# Patient Record
Sex: MALE | Race: OTHER | HISPANIC OR LATINO | Employment: FULL TIME | URBAN - METROPOLITAN AREA
[De-identification: names, ages, dates, MRNs, and addresses within clinical notes are randomized per-mention and may not be internally consistent; named-entity substitution may affect disease eponyms.]

---

## 2023-06-12 ENCOUNTER — OFFICE VISIT (OUTPATIENT)
Dept: FAMILY MEDICINE CLINIC | Facility: CLINIC | Age: 23
End: 2023-06-12
Payer: COMMERCIAL

## 2023-06-12 VITALS
HEART RATE: 75 BPM | HEIGHT: 64 IN | TEMPERATURE: 98.9 F | BODY MASS INDEX: 27.31 KG/M2 | DIASTOLIC BLOOD PRESSURE: 78 MMHG | WEIGHT: 160 LBS | SYSTOLIC BLOOD PRESSURE: 118 MMHG | RESPIRATION RATE: 17 BRPM | OXYGEN SATURATION: 96 %

## 2023-06-12 DIAGNOSIS — Z76.89 ENCOUNTER TO ESTABLISH CARE: Primary | ICD-10-CM

## 2023-06-12 DIAGNOSIS — Z00.00 ANNUAL PHYSICAL EXAM: ICD-10-CM

## 2023-06-12 PROCEDURE — 99385 PREV VISIT NEW AGE 18-39: CPT | Performed by: PHYSICIAN ASSISTANT

## 2023-06-12 RX ORDER — KETOCONAZOLE 20 MG/ML
SHAMPOO TOPICAL
COMMUNITY
Start: 2023-05-04 | End: 2023-06-12 | Stop reason: ALTCHOICE

## 2023-06-12 RX ORDER — CLOBETASOL PROPIONATE 0.05 G/100ML
SHAMPOO TOPICAL
COMMUNITY
Start: 2023-04-28 | End: 2023-06-12 | Stop reason: ALTCHOICE

## 2023-06-12 RX ORDER — TERBINAFINE HYDROCHLORIDE 250 MG/1
TABLET ORAL
COMMUNITY
Start: 2023-05-04 | End: 2023-06-12 | Stop reason: ALTCHOICE

## 2023-06-12 RX ORDER — GRISEOFULVIN (MICROSIZE) 125 MG/5ML
SUSPENSION ORAL
COMMUNITY
Start: 2023-04-26 | End: 2023-06-12 | Stop reason: ALTCHOICE

## 2023-06-12 NOTE — PROGRESS NOTES
ADULT ANNUAL PHYSICAL   [facility] PRIMARY CARE [facility]    NAME: Lloyd Cototn  AGE: 25 y o  SEX: male  : 2000     DATE: 2023     Assessment and Plan:     Problem List Items Addressed This Visit        Other    BMI 27 0-27 9,adult     BMI Counseling: Body mass index is 27 46 kg/m²  The BMI is above normal  Nutrition recommendations include reducing portion sizes, decreasing overall calorie intake, 3-5 servings of fruits/vegetables daily, moderation in carbohydrate intake, increasing intake of lean protein, reducing intake of saturated fat and trans fat and reducing intake of cholesterol  Exercise recommendations include exercising 3-5 times per week and strength training exercises  Other Visit Diagnoses     Encounter to establish care    -  Primary    moved from [place] in 2022, works as  for [employer], establish care to get 's physical    Annual physical exam              Immunizations and preventive care screenings were discussed with patient today  Appropriate education was printed on patient's after visit summary  Counseling:  Alcohol/drug use: discussed moderation in alcohol intake, the recommendations for healthy alcohol use, and avoidance of illicit drug use  Dental Health: discussed importance of regular tooth brushing, flossing, and dental visits  Injury prevention: discussed safety/seat belts, safety helmets, smoke detectors, carbon dioxide detectors, and smoking near bedding or upholstery  Sexual health: discussed sexually transmitted diseases, partner selection, use of condoms, avoidance of unintended pregnancy, and contraceptive alternatives  Exercise: the importance of regular exercise/physical activity was discussed  Recommend exercise 3-5 times per week for at least 30 minutes  Depression Screening and Follow-up Plan: Patient was screened for depression during today's encounter   They screened negative with a PHQ-2 score of 0  Return in about 1 year (around 6/12/2024)  Chief Complaint:     Chief Complaint   Patient presents with   • Establish Care      History of Present Illness:     Adult Annual Physical   Patient here for a comprehensive physical exam  The patient reports no problems  [address] in March was last appointment  Came to [place] from [place] in September  Lives with wife  Has one child, 5years old  No complaints  Had labs for hep C and HIV screening done earlier this year, does not want to check labs again  No smoking, ETOH, or drug use  Previous fungal infection of scalp resolved  Works in cleaning for [employer]  Diet and Physical Activity  Diet/Nutrition: well balanced diet  Fruits   Exercise: walking, strength training exercises and 1-2 times a week on average  Depression Screening  PHQ-2/9 Depression Screening    Little interest or pleasure in doing things: 0 - not at all  Feeling down, depressed, or hopeless: 0 - not at all  PHQ-2 Score: 0  PHQ-2 Interpretation: Negative depression screen       General Health  Sleep: sleeps well and gets 7-8 hours of sleep on average  Hearing: normal - bilateral   Vision: no vision problems  Dental: brushes teeth twice daily   Health  History of STDs?: no      Review of Systems:     Review of Systems   Constitutional: Negative for chills and fever  HENT: Negative for ear pain and sore throat  Eyes: Negative for pain and visual disturbance  Respiratory: Negative for cough and shortness of breath  Cardiovascular: Negative for chest pain and palpitations  Gastrointestinal: Negative for abdominal pain and vomiting  Genitourinary: Negative for dysuria and hematuria  Musculoskeletal: Negative for arthralgias and back pain  Skin: Negative for color change and rash  Neurological: Negative for seizures and syncope  All other systems reviewed and are negative       Past Medical History: "    History reviewed  No pertinent past medical history  Past Surgical History:     History reviewed  No pertinent surgical history  Social History:     Social History     Socioeconomic History   • Marital status: Single     Spouse name: None   • Number of children: None   • Years of education: None   • Highest education level: None   Occupational History   • None   Tobacco Use   • Smoking status: Never   • Smokeless tobacco: Never   Substance and Sexual Activity   • Alcohol use: None   • Drug use: None   • Sexual activity: None   Other Topics Concern   • None   Social History Narrative   • None     Social Determinants of Health     Financial Resource Strain: Not on file   Food Insecurity: Not on file   Transportation Needs: Not on file   Physical Activity: Not on file   Stress: Not on file   Social Connections: Not on file   Intimate Partner Violence: Not on file   Housing Stability: Not on file      Family History:     History reviewed  No pertinent family history  Current Medications:     No current outpatient medications on file  No current facility-administered medications for this visit  Allergies:     No Known Allergies   Physical Exam:     /78 (BP Location: Left arm, Patient Position: Sitting, Cuff Size: Standard)   Pulse 75   Temp 98 9 °F (37 2 °C) (Tympanic)   Resp 17   Ht 5' 4\" (1 626 m)   Wt 72 6 kg (160 lb)   SpO2 96%   BMI 27 46 kg/m²     Physical Exam  Vitals and nursing note reviewed  Constitutional:       General: He is not in acute distress  Appearance: He is well-developed  HENT:      Head: Normocephalic and atraumatic  Eyes:      Conjunctiva/sclera: Conjunctivae normal    Cardiovascular:      Rate and Rhythm: Normal rate and regular rhythm  Heart sounds: No murmur heard  Pulmonary:      Effort: Pulmonary effort is normal  No respiratory distress  Breath sounds: Normal breath sounds  Abdominal:      Palpations: Abdomen is soft        Tenderness: " There is no abdominal tenderness  Musculoskeletal:         General: No swelling  Cervical back: Neck supple  Skin:     General: Skin is warm and dry  Capillary Refill: Capillary refill takes less than 2 seconds  Neurological:      Mental Status: He is alert     Psychiatric:         Mood and Affect: Mood normal           Huong Saenz PA-C   [address]

## 2023-06-12 NOTE — ASSESSMENT & PLAN NOTE
BMI Counseling: Body mass index is 27 46 kg/m²  The BMI is above normal  Nutrition recommendations include reducing portion sizes, decreasing overall calorie intake, 3-5 servings of fruits/vegetables daily, moderation in carbohydrate intake, increasing intake of lean protein, reducing intake of saturated fat and trans fat and reducing intake of cholesterol  Exercise recommendations include exercising 3-5 times per week and strength training exercises

## 2023-11-15 ENCOUNTER — OFFICE VISIT (OUTPATIENT)
Dept: DENTISTRY | Facility: CLINIC | Age: 23
End: 2023-11-15

## 2023-11-15 VITALS — DIASTOLIC BLOOD PRESSURE: 75 MMHG | HEART RATE: 70 BPM | TEMPERATURE: 97.8 F | SYSTOLIC BLOOD PRESSURE: 113 MMHG

## 2023-11-15 DIAGNOSIS — K02.9 CARIES: Primary | ICD-10-CM

## 2023-11-15 PROCEDURE — D0330 PANORAMIC RADIOGRAPHIC IMAGE: HCPCS | Performed by: DENTIST

## 2023-11-15 PROCEDURE — D0274 BITEWINGS - 4 RADIOGRAPHIC IMAGES: HCPCS | Performed by: DENTIST

## 2023-11-15 PROCEDURE — D0150 COMPREHENSIVE ORAL EVALUATION - NEW OR ESTABLISHED PATIENT: HCPCS | Performed by: DENTIST

## 2023-11-15 NOTE — DENTAL PROCEDURE DETAILS
Jaye Will presents for a Comprehensive exam. Verbal consent for treatment given in addition to the forms. Reviewed health history - Patient is ASA II  Consents signed: Yes     Perio: Heavy bleeding  Pain Scale: 2  Caries Assessment: Medium  Radiographs: Panorex  Bitewings x4     Oral Hygiene instruction reviewed and given. Recommended Hygiene recall visits with the [address] [place] [address]. Treatment Plan:  1. Infection control: referred for   2. Periodontal therapy: adult prophy/ [facility]  3. Caries control: as charted  4. Occlusal evaluation:   5. Case Difficulty Type 2  Prognosis is Good.   Referrals needed: No  Next Visit:  Visit date not found

## 2023-11-15 NOTE — PROGRESS NOTES
Comprehensive Exam    [address] [place] [address] presents for a comprehensive exam. Verbal consent for treatment given in addition to the forms. Assistant A did interpretation  Needs . Reviewed health history - Patient is ASA  II  Pmhx- see epic  NKDA  Consents signed: Yes  CC: none; has not had teeth cleaned EVER. Perio: Localized #needs debridement in order to perform periodontal probings accurately  Pain Scale: 0  Caries Assessment: medium  Radiographs: Bitewing 4, - caries noted. Heavy tartar buildup;                         Panorex, all wisdom teeth are present and partially erupted. Max siis clear bilaterally; normal positioning of condyles in fossae. Ex- Adult dentition; max anterior incisor spacing. ++ heavy tartar and stain; never had teeth cleaned. ++ swollen. red gingiva generalized secondary to heavy buildup. ++ wisdom teeth partially erupted and rec extr;  May need ortho in future; needs perio tx first  Soft tissue: generalized swollen gingival margins;     Oral Hygiene instruction reviewed and given. Hygiene recall visits recommended to the patient. Periodontal disease discussed thru interpretation. Treatment Plan:  Infection control-  Periodontal therapy: needs debridement   Caries control:restorative; may change after debridement  Occlusal evaluation: may need ortho in future  Referral given for OS wisdom teeth removal.    Prognosis is Good.   Referrals needed: yes   Next visit: periodontal debridement

## 2023-12-05 ENCOUNTER — OFFICE VISIT (OUTPATIENT)
Dept: DENTISTRY | Facility: CLINIC | Age: 23
End: 2023-12-05

## 2023-12-05 VITALS — DIASTOLIC BLOOD PRESSURE: 74 MMHG | SYSTOLIC BLOOD PRESSURE: 121 MMHG | HEART RATE: 54 BPM

## 2023-12-05 DIAGNOSIS — K05.30 CHRONIC PERICORONITIS: Primary | ICD-10-CM

## 2023-12-05 PROCEDURE — D0140 LIMITED ORAL EVALUATION - PROBLEM FOCUSED: HCPCS | Performed by: DENTIST

## 2023-12-05 RX ORDER — AMOXICILLIN 500 MG/1
500 CAPSULE ORAL EVERY 8 HOURS SCHEDULED
Qty: 21 CAPSULE | Refills: 0 | Status: SHIPPED | OUTPATIENT
Start: 2023-12-05 | End: 2023-12-12

## 2023-12-05 NOTE — DENTAL PROCEDURE DETAILS
Limited Emergency Dental Evaluation. Patient (Edinson Cortes. Tsering Cotton) presents for dental emergency evaluation. Patient consent treatment. [facility], no changes. ASA: II  Pain score: 3  Chief complain: "pain of lower right back tooth". HPI: patient reported he was in severe pain last week but now is less pain. Patient denies pain or sensitivity to hot, cold and biting. Dental History:  Patient was seen here by other provider on 11/15/2023 and was referred to [facility] for extraction of wisdom teeth. Also patient was treatment planned for periodontal debridement. Radiograph: current. EOE: WNL. IOE/Assessment/plan: Tooth #32 is partially erupted with swollen hyperplastic overgrown operculum gingival tissue and traumatic occlusion from opposing tooth #1. DX: Chronic pericoronitis with acute exacerbation. Informed and demonstrated to patient. Referral: [facility] for evaluation and extraction of teeth #1 and #32. RX: Amoxicillin 500 mg, 21 caps, TID, for 7 days, no refills. Patient denies allergy. Advised patient to schedule for perio full mouth debridement ASAP. Patient aware and understood. NV: Full Mouth Debridement with HYG.

## 2024-08-02 ENCOUNTER — TELEPHONE (OUTPATIENT)
Dept: FAMILY MEDICINE CLINIC | Facility: CLINIC | Age: 24
End: 2024-08-02

## 2024-08-02 NOTE — TELEPHONE ENCOUNTER
Called pt to schedule annual physical pt stated he will call back to reschedule. Pt was offered self-pay pt denied.

## 2025-08-08 ENCOUNTER — OFFICE VISIT (OUTPATIENT)
Dept: FAMILY MEDICINE CLINIC | Facility: CLINIC | Age: 25
End: 2025-08-08
Payer: COMMERCIAL

## 2025-08-08 VITALS
HEART RATE: 62 BPM | SYSTOLIC BLOOD PRESSURE: 102 MMHG | RESPIRATION RATE: 16 BRPM | DIASTOLIC BLOOD PRESSURE: 60 MMHG | BODY MASS INDEX: 28.09 KG/M2 | OXYGEN SATURATION: 98 % | HEIGHT: 65 IN | TEMPERATURE: 97.3 F | WEIGHT: 168.6 LBS

## 2025-08-08 DIAGNOSIS — Z00.00 ANNUAL PHYSICAL EXAM: Primary | ICD-10-CM

## 2025-08-08 DIAGNOSIS — R31.29 MICROSCOPIC HEMATURIA: ICD-10-CM

## 2025-08-08 DIAGNOSIS — E78.1 HYPERTRIGLYCERIDEMIA: ICD-10-CM

## 2025-08-08 DIAGNOSIS — Z13.6 SCREENING FOR CARDIOVASCULAR CONDITION: ICD-10-CM

## 2025-08-08 DIAGNOSIS — Z11.59 NEED FOR HEPATITIS C SCREENING TEST: ICD-10-CM

## 2025-08-08 DIAGNOSIS — E66.3 OVERWEIGHT (BMI 25.0-29.9): ICD-10-CM

## 2025-08-08 DIAGNOSIS — L40.9 PSORIASIS OF SCALP: ICD-10-CM

## 2025-08-08 DIAGNOSIS — Z11.4 SCREENING FOR HIV (HUMAN IMMUNODEFICIENCY VIRUS): ICD-10-CM

## 2025-08-08 PROBLEM — L29.89 PRURITIC DERMATOSIS OF SCALP: Status: ACTIVE | Noted: 2022-10-27

## 2025-08-08 PROBLEM — A74.9 CHLAMYDIAL INFECTION: Status: RESOLVED | Noted: 2022-11-01 | Resolved: 2025-08-08

## 2025-08-08 PROCEDURE — 99395 PREV VISIT EST AGE 18-39: CPT | Performed by: PHYSICIAN ASSISTANT

## 2025-08-08 PROCEDURE — 99214 OFFICE O/P EST MOD 30 MIN: CPT | Performed by: PHYSICIAN ASSISTANT

## 2025-08-08 RX ORDER — MOMETASONE FUROATE 1 MG/ML
LOTION TOPICAL DAILY
Qty: 30 ML | Refills: 0 | Status: SHIPPED | OUTPATIENT
Start: 2025-08-08

## 2025-08-08 RX ORDER — CLOBETASOL PROPIONATE 0.05 G/100ML
SHAMPOO TOPICAL
COMMUNITY
End: 2025-08-08 | Stop reason: ALTCHOICE